# Patient Record
Sex: MALE | Race: WHITE | NOT HISPANIC OR LATINO | Employment: PART TIME | URBAN - METROPOLITAN AREA
[De-identification: names, ages, dates, MRNs, and addresses within clinical notes are randomized per-mention and may not be internally consistent; named-entity substitution may affect disease eponyms.]

---

## 2024-09-15 ENCOUNTER — HOSPITAL ENCOUNTER (EMERGENCY)
Facility: HOSPITAL | Age: 34
Discharge: HOME | End: 2024-09-15
Attending: STUDENT IN AN ORGANIZED HEALTH CARE EDUCATION/TRAINING PROGRAM
Payer: COMMERCIAL

## 2024-09-15 VITALS
SYSTOLIC BLOOD PRESSURE: 116 MMHG | HEART RATE: 76 BPM | RESPIRATION RATE: 18 BRPM | WEIGHT: 166 LBS | HEIGHT: 64 IN | TEMPERATURE: 98 F | BODY MASS INDEX: 28.34 KG/M2 | DIASTOLIC BLOOD PRESSURE: 67 MMHG | OXYGEN SATURATION: 98 %

## 2024-09-15 DIAGNOSIS — R07.9 CHEST PAIN: Primary | ICD-10-CM

## 2024-09-15 DIAGNOSIS — R06.00 DYSPNEA: ICD-10-CM

## 2024-09-15 PROCEDURE — 99284 EMERGENCY DEPT VISIT MOD MDM: CPT | Mod: 25

## 2024-09-15 PROCEDURE — 93010 ELECTROCARDIOGRAM REPORT: CPT | Mod: ,,, | Performed by: INTERNAL MEDICINE

## 2024-09-15 PROCEDURE — 93005 ELECTROCARDIOGRAM TRACING: CPT

## 2024-09-15 NOTE — DISCHARGE INSTRUCTIONS
Thanks for letting us take care of you today!  It is our goal to give you courteous care and to keep you comfortable and informed, if you have any questions before you leave I will be happy to try and answer them.    Here is some advice after your visit:    Your visit in the emergency department is NOT definitive care - please follow-up with your primary care doctor and/or specialist within 1-2 days. Please return to the emergency department if you develop worsening symptoms including: fever, chills, chest pain, shortness of breath, weakness, numbness, tingling, nausea, vomiting, inability to eat, drink, or take your medication. Please return if you have any worsening in your condition or if you have any other concerns.    If you had radiology exams like an XRAY or CT in the emergency Department the interpreation on them may be preliminary - there may be less time sensitive findings on the reports please obtain these reports within 24 hours from the hospital or by using your out on your mobile phone to access records.  Bring these to your primary care doctor and/or specialist for further review of incidental findings.    Please review any LAB WORK from your visit today with your primary care physician.    Please Follow up with your primary care provider (PCP), if you do not have a PCP, the contact information for the [facility] family medicine clinic is provided, you may call to schedule an appointment to establish care.  You may also consider calling [phone] to schedule an appointment with an [facility] PCP.

## 2024-09-15 NOTE — ED PROVIDER NOTES
Encounter Date: 9/15/2024       History     Chief Complaint   Patient presents with    Chest Pain     C/o tingling in cheeks, left upper arm and right lower pt states that he was sleeping when the tingling woke him up and he began to have chest tightness he reports he took unisom 25 mg to help him sleep. Pt reports using Kratum tonight      This is a 34-year-old male with no past medical history presents emergency department chief complaint of chest tightness, tingling in extremities, shortness of breath.  Reports symptoms began suddenly around 0230 that woke him from sleep.  States that this time symptoms have completely resolved.  No fever no chills, no nausea no vomiting.  States that this time feels much better.  Does report taking a Unisom last night before the symptoms, also recently endorses some kratom use.    The history is provided by the patient.     Review of patient's allergies indicates:  No Known Allergies  No past medical history on file.  No past surgical history on file.  No family history on file.     Review of Systems   Constitutional:  Negative for chills and fever.   Respiratory:  Positive for chest tightness and shortness of breath.    Neurological:         Paresthesias       Physical Exam     Initial Vitals [09/15/24 0354]   BP Pulse Resp Temp SpO2   138/87 93 18 98 °F (36.7 °C) 99 %      MAP       --         Physical Exam    Nursing note and vitals reviewed.  Constitutional: He appears well-developed and well-nourished. He is not diaphoretic. No distress.   Patient was sleeping comfortably.  Awakens without difficulty.  NAD no respiratory distress.   HENT:   Head: Normocephalic and atraumatic.   Right Ear: External ear normal.   Left Ear: External ear normal.   Nose: Nose normal.   Eyes: EOM are normal. Pupils are equal, round, and reactive to light. Right eye exhibits no discharge. Left eye exhibits no discharge.   Cardiovascular:  Normal rate, regular rhythm and normal heart sounds.      Exam reveals no gallop and no friction rub.       No murmur heard.  Pulmonary/Chest: Effort normal and breath sounds normal. No respiratory distress. He has no wheezes. He has no rhonchi. He has no rales. He exhibits no tenderness.   Speaking complete sentences.   Abdominal: Abdomen is soft. Bowel sounds are normal. He exhibits no distension and no mass. There is no abdominal tenderness. There is no rebound and no guarding.   Musculoskeletal:         General: No edema. Normal range of motion.     Neurological: He is alert and oriented to person, place, and time. He has normal strength. No cranial nerve deficit or sensory deficit.   No focal neuro deficits.  GCS 15.  Awake alert oriented speaking complete sentences.  Equal strength sensation in bilateral upper and lower extremities.  Sensation intact.   Skin: Skin is warm and dry. Capillary refill takes less than 2 seconds.         ED Course   Procedures  Labs Reviewed - No data to display       Imaging Results              X-Ray Chest 1 View (In process)                      Medications - No data to display  Medical Decision Making  Differential diagnosis to include but not limited to CVA, TIA, electrolyte abnormality, renal dysfunction, anxiety, stress, adverse reaction of kratom, adverse medication reaction    Patient was continues to be asymptomatic here in the emergency department.  Awake alert well-appearing.  NAD.  Workup benign.  Asymptomatic.  Requesting discharge home.  Believe this to be reasonable. Return precautions given.  Questions invited, questions answered to the best my ability.  Patient discharged home condition stable.      Amount and/or Complexity of Data Reviewed  External Data Reviewed: notes.     Details: See ED course  Radiology: ordered and independent interpretation performed. Decision-making details documented in ED Course.  ECG/medicine tests: ordered and independent interpretation performed. Decision-making details documented in ED  Course.    Risk  OTC drugs.  Prescription drug management.               ED Course as of 09/15/24 0723   Sun Sep 15, 2024   0503 Chart review does not reveal any prior visits nor anything in care everywhere [MM]   0508 EKG done at 4:21 a.m. shows sinus rhythm rate of 79 .  Normal axis.  No obvious ST elevation or depression.  Inverted T-waves in the inferior leads. [MM]   0625 X-Ray Chest 1 View  No obvious infiltrate, rib fracture, pneumothorax [MM]   0710 Re-evaluation patient was resting comfortably.  NAD.  Discussed findings, unremarkable EKG, negative chest x-ray.  Patient reports he continues to be asymptomatic.  He was comfortable with discharge home. Return precautions given.  Questions invited, questions answered to the best my ability.  Patient discharged home condition stable.   [MM]      ED Course User Index  [MM] Juancarlos Aguilar MD                           Clinical Impression:  Final diagnoses:  [R07.9] Chest pain (Primary)  [R06.00] Dyspnea          ED Disposition Condition    Discharge Stable          ED Prescriptions    None       Follow-up Information       Follow up With Specialties Details Why Contact [facility], [facility]  Call   [address] [facility] [address] [phone]               Juancarlos Aguilar MD  09/15/24 0711

## 2024-09-16 LAB
OHS QRS DURATION: 86 MS
OHS QTC CALCULATION: 438 MS

## 2024-10-22 ENCOUNTER — PATIENT MESSAGE (OUTPATIENT)
Dept: RESEARCH | Facility: HOSPITAL | Age: 34
End: 2024-10-22
Payer: COMMERCIAL